# Patient Record
Sex: FEMALE | Race: WHITE | NOT HISPANIC OR LATINO | Employment: OTHER | URBAN - METROPOLITAN AREA
[De-identification: names, ages, dates, MRNs, and addresses within clinical notes are randomized per-mention and may not be internally consistent; named-entity substitution may affect disease eponyms.]

---

## 2022-04-10 ENCOUNTER — HISTORICAL (OUTPATIENT)
Dept: ADMINISTRATIVE | Facility: HOSPITAL | Age: 87
End: 2022-04-10

## 2022-04-24 VITALS
WEIGHT: 147.06 LBS | OXYGEN SATURATION: 96 % | BODY MASS INDEX: 28.87 KG/M2 | SYSTOLIC BLOOD PRESSURE: 160 MMHG | DIASTOLIC BLOOD PRESSURE: 90 MMHG | HEIGHT: 60 IN

## 2022-08-16 ENCOUNTER — OFFICE VISIT (OUTPATIENT)
Dept: FAMILY MEDICINE | Facility: CLINIC | Age: 87
End: 2022-08-16
Payer: MEDICARE

## 2022-08-16 VITALS
HEIGHT: 60 IN | WEIGHT: 141.31 LBS | RESPIRATION RATE: 20 BRPM | BODY MASS INDEX: 27.74 KG/M2 | TEMPERATURE: 98 F | DIASTOLIC BLOOD PRESSURE: 77 MMHG | SYSTOLIC BLOOD PRESSURE: 119 MMHG | OXYGEN SATURATION: 96 % | HEART RATE: 89 BPM

## 2022-08-16 DIAGNOSIS — I10 HYPERTENSION, UNSPECIFIED TYPE: Primary | ICD-10-CM

## 2022-08-16 DIAGNOSIS — R55 SYNCOPE AND COLLAPSE: ICD-10-CM

## 2022-08-16 DIAGNOSIS — M80.08XA AGE-RELATED OSTEOPOROSIS WITH CURRENT PATHOLOGICAL FRACTURE, VERTEBRA(E), INITIAL ENCOUNTER FOR FRACTURE: ICD-10-CM

## 2022-08-16 DIAGNOSIS — E78.5 HYPERLIPIDEMIA, UNSPECIFIED HYPERLIPIDEMIA TYPE: ICD-10-CM

## 2022-08-16 PROCEDURE — 99214 OFFICE O/P EST MOD 30 MIN: CPT | Mod: PBBFAC | Performed by: FAMILY MEDICINE

## 2022-08-16 RX ORDER — SIMVASTATIN 10 MG/1
10 TABLET, FILM COATED ORAL NIGHTLY
COMMUNITY
End: 2022-12-01 | Stop reason: SDUPTHER

## 2022-08-16 RX ORDER — HYDROXYZINE HYDROCHLORIDE 25 MG/1
25 TABLET, FILM COATED ORAL NIGHTLY
Qty: 90 TABLET | Refills: 0 | Status: SHIPPED | OUTPATIENT
Start: 2022-08-16 | End: 2022-08-16

## 2022-08-16 RX ORDER — BENAZEPRIL HYDROCHLORIDE 10 MG/1
10 TABLET ORAL DAILY
COMMUNITY
End: 2022-11-29 | Stop reason: SDUPTHER

## 2022-08-16 RX ORDER — HYDROXYZINE HYDROCHLORIDE 25 MG/1
25 TABLET, FILM COATED ORAL NIGHTLY PRN
Qty: 90 TABLET | Refills: 0 | Status: SHIPPED | OUTPATIENT
Start: 2022-08-16 | End: 2022-11-14

## 2022-08-16 NOTE — PROGRESS NOTES
[facility]    Subjective:        Maria E Hernandez is a 89 y.o. female who  has no past medical history on file.  She presents to clinic today for follow-up of chronic medical conditions. Patient has been having vertigo:    Vertigo.  started,  and  3 episodes and fell.  fell in the house.  and today had 2 episodes.  3 weeks ago felt that the room is upside down and threw up. Next day she was fine. She has a hard time going to sleep and keep sleeping  Feels like the room is spinning. No warning, feels like if she turned her head too quickly. Lasted couple hours last time. Usually she falls and its instant couple of mins. No confusion post ictally. No loss of hearing, blurry vision, No vision loss. No hx of seizures or migraines. Dizzy when she looks up and looks to the side.     No sleep apnea.   Insomnia:  Goes to sleep 9, 9:30 takes couple of hours for her to fall asleep. Had opposite effect when she had the trazodone. Have to use the bathroom a lot.   Patient says her incontinence is more her chart, had a  has, and well patient says that she wakes up 4-5 times due to urge to urinate some she does not make it to the bathroom.      Patient is independent able to pay to complete her daily activity of living cooking, cleaning, bathing, dressing herself.  Patient is able to drive and has a license.    Patient has had no falls as of late.  Patient says that the vertigo instances back proximally 10+ years ago that she fell and hit her head, however no other medical conditions except for hypertension and hyperlipidemia which she takes simvastatin and benazepril.  Patient has never had imaging or blood test on her records.    Appetitie is good  Mood is good  Can walk     Review of Systems:  10 point ROS negative except for HPI    Objective:   Vital Signs:  Vitals:    22 1251   BP: 119/77   Pulse: 89   Resp: 20   Temp: 98.2 °F (36.8 °C)        Body mass index is 27.74 kg/m².      General:  Well developed, well nourished, no acute respiratory distress  Head: Normocephalic, atraumatic  Eyes: PERRL, anicteric sclera  Throat: No posterior pharyngeal erythema or exudate, no tonsillar exudate  Neck: supple, normal ROM, no JVD  CVS:  RRR, S1 and S2 normal, no murmurs, no added heart sounds, rubs, gallops, regular peripheral pulses, and no peripheral edema  Resp:  Lungs clear to auscultation bilaterally, no wheezes, rales, or rhonci  GI:  Abdomen soft, non-tender, non-distended, normoactive bowel sounds  MSK:  Full range of motion, no obvious deformities   Skin:  No rashes, ulcers, erythema  Neuro:  Alert and oriented x3,   Cerebellar testing negative, FNF negative, cranial nerves 2-12 with no gross defect, gaze word nystagmus that is extinguishable after 4-5 beats.  HINTS exam is positive for peripheral vertigo.  Asya-Hallpike was positive for dizziness.  With no overt nystagmus.  Psych:  Appropriate mood and affect         Laboratory:  No results found for: WBC, HGB, HCT, PLT, MCV, RDW, IRON, TIBC, FERRITIN, AZPHRLOO17, FOLATE  No results found for: HGBA1C, EAG, GLUF, MICROALBUR, LDLCALC, CREATININE, CREATRANDUR, PROTEINURINE No results found for: NA, K, CL, CO2, BUN, CREATININE, GLU, CALCIUM, MG, PHOS  No results found for: TSH, [id number], L4ZKTXG, M1PTJAV, THYROIDAB         Current Medications:  No current outpatient medications     Assessment and Plan:        Health Maintenance Due   Topic Date Due    Lipid Panel  Never done    TETANUS VACCINE  Never done    Shingles Vaccine (1 of 2) Never done    Pneumococcal Vaccines (Age 65+) (2 - PPSV23 or PCV20) 09/23/2017    COVID-19 Vaccine (3 - Booster for Pfizer series) 07/18/2021        BPPV:  -HINTS positive, will get CT scan to rule out any structural defect in the brain   -Give handouts for epley maneuver  -Meclizine if patient has anymore symptoms, and does not improve with epley    Htn:   119/77  Benazpril 10    HLD:  Simvistatin  10    Insomnia:  Hydroxyzine HCL 25 prn QHS     HCM:  Will get a dexa    RTC in 2 months   Beronica Carrillo MD      Answers for HPI/ROS submitted by the patient on 8/15/2022  activity change: No  unexpected weight change: No  neck pain: No  hearing loss: Yes  rhinorrhea: No  trouble swallowing: No  eye discharge: No  visual disturbance: No  chest tightness: No  wheezing: No  chest pain: No  palpitations: No  blood in stool: No  constipation: No  vomiting: No  diarrhea: No  polydipsia: No  polyuria: No  difficulty urinating: No  hematuria: No  menstrual problem: No  dysuria: No  joint swelling: No  arthralgias: No  headaches: No  weakness: No  confusion: No  dysphoric mood: No

## 2022-09-06 ENCOUNTER — HOSPITAL ENCOUNTER (OUTPATIENT)
Dept: RADIOLOGY | Facility: HOSPITAL | Age: 87
Discharge: HOME | End: 2022-09-06
Attending: STUDENT IN AN ORGANIZED HEALTH CARE EDUCATION/TRAINING PROGRAM
Payer: MEDICARE

## 2022-09-06 DIAGNOSIS — R55 SYNCOPE AND COLLAPSE: ICD-10-CM

## 2022-09-06 PROCEDURE — 70450 CT HEAD/BRAIN W/O DYE: CPT | Mod: TC

## 2022-10-24 ENCOUNTER — OFFICE VISIT (OUTPATIENT)
Dept: FAMILY MEDICINE | Facility: CLINIC | Age: 87
End: 2022-10-24
Payer: MEDICARE

## 2022-10-24 VITALS
DIASTOLIC BLOOD PRESSURE: 79 MMHG | HEART RATE: 72 BPM | HEIGHT: 60 IN | BODY MASS INDEX: 27.35 KG/M2 | WEIGHT: 139.31 LBS | SYSTOLIC BLOOD PRESSURE: 123 MMHG | OXYGEN SATURATION: 96 % | TEMPERATURE: 98 F | RESPIRATION RATE: 20 BRPM

## 2022-10-24 DIAGNOSIS — F51.01 PRIMARY INSOMNIA: ICD-10-CM

## 2022-10-24 DIAGNOSIS — L60.0 INGROWN NAIL: ICD-10-CM

## 2022-10-24 DIAGNOSIS — E78.5 HYPERLIPIDEMIA, UNSPECIFIED HYPERLIPIDEMIA TYPE: Primary | ICD-10-CM

## 2022-10-24 DIAGNOSIS — Z79.899 ENCOUNTER FOR LONG-TERM (CURRENT) USE OF MEDICATIONS: ICD-10-CM

## 2022-10-24 DIAGNOSIS — I10 HYPERTENSION, UNSPECIFIED TYPE: ICD-10-CM

## 2022-10-24 DIAGNOSIS — Z00.00 HEALTHCARE MAINTENANCE: ICD-10-CM

## 2022-10-24 PROBLEM — G47.00 INSOMNIA, UNSPECIFIED: Status: ACTIVE | Noted: 2022-10-24

## 2022-10-24 LAB
ALBUMIN SERPL-MCNC: 4.3 GM/DL (ref 3.4–4.8)
ALBUMIN/GLOB SERPL: 1.5 RATIO (ref 1.1–2)
ALP SERPL-CCNC: 72 UNIT/L (ref 40–150)
ALT SERPL-CCNC: 16 UNIT/L (ref 0–55)
AST SERPL-CCNC: 24 UNIT/L (ref 5–34)
BASOPHILS # BLD AUTO: 0.09 X10(3)/MCL (ref 0–0.2)
BASOPHILS NFR BLD AUTO: 1.2 %
BILIRUBIN DIRECT+TOT PNL SERPL-MCNC: 0.8 MG/DL
BUN SERPL-MCNC: 11.8 MG/DL (ref 9.8–20.1)
CALCIUM SERPL-MCNC: 10.3 MG/DL (ref 8.4–10.2)
CHLORIDE SERPL-SCNC: 100 MMOL/L (ref 98–111)
CHOLEST SERPL-MCNC: 200 MG/DL
CHOLEST/HDLC SERPL: 2 {RATIO} (ref 0–5)
CO2 SERPL-SCNC: 29 MMOL/L (ref 23–31)
CREAT SERPL-MCNC: 0.9 MG/DL (ref 0.55–1.02)
DEPRECATED CALCIDIOL+CALCIFEROL SERPL-MC: 37.5 NG/ML (ref 30–80)
EOSINOPHIL # BLD AUTO: 0.31 X10(3)/MCL (ref 0–0.9)
EOSINOPHIL NFR BLD AUTO: 4.2 %
ERYTHROCYTE [DISTWIDTH] IN BLOOD BY AUTOMATED COUNT: 12.9 % (ref 11.5–17)
GFR SERPLBLD CREATININE-BSD FMLA CKD-EPI: 60 MLS/MIN/1.73/M2
GLOBULIN SER-MCNC: 2.9 GM/DL (ref 2.4–3.5)
GLUCOSE SERPL-MCNC: 115 MG/DL (ref 75–121)
HCT VFR BLD AUTO: 47.2 % (ref 37–47)
HDLC SERPL-MCNC: 87 MG/DL (ref 35–60)
HGB BLD-MCNC: 15.4 GM/DL (ref 12–16)
IMM GRANULOCYTES # BLD AUTO: 0.03 X10(3)/MCL (ref 0–0.04)
IMM GRANULOCYTES NFR BLD AUTO: 0.4 %
LDLC SERPL CALC-MCNC: 91 MG/DL (ref 50–140)
LYMPHOCYTES # BLD AUTO: 1.6 X10(3)/MCL (ref 0.6–4.6)
LYMPHOCYTES NFR BLD AUTO: 21.8 %
MCH RBC QN AUTO: 32.1 PG (ref 27–31)
MCHC RBC AUTO-ENTMCNC: 32.6 MG/DL (ref 33–36)
MCV RBC AUTO: 98.3 FL (ref 80–94)
MONOCYTES # BLD AUTO: 0.62 X10(3)/MCL (ref 0.1–1.3)
MONOCYTES NFR BLD AUTO: 8.4 %
NEUTROPHILS # BLD AUTO: 4.7 X10(3)/MCL (ref 2.1–9.2)
NEUTROPHILS NFR BLD AUTO: 64 %
NRBC BLD AUTO-RTO: 0 %
PLATELET # BLD AUTO: 334 X10(3)/MCL (ref 130–400)
PMV BLD AUTO: 9.5 FL (ref 7.4–10.4)
POTASSIUM SERPL-SCNC: 4.5 MMOL/L (ref 3.5–5.1)
PROT SERPL-MCNC: 7.2 GM/DL (ref 5.8–7.6)
RBC # BLD AUTO: 4.8 X10(6)/MCL (ref 4.2–5.4)
SODIUM SERPL-SCNC: 139 MMOL/L (ref 132–146)
T4 FREE SERPL-MCNC: 0.94 NG/DL (ref 0.7–1.48)
TRIGL SERPL-MCNC: 111 MG/DL (ref 37–140)
TSH SERPL-ACNC: 1.56 UIU/ML (ref 0.35–4.94)
VLDLC SERPL CALC-MCNC: 22 MG/DL
WBC # SPEC AUTO: 7.4 X10(3)/MCL (ref 4.5–11.5)

## 2022-10-24 PROCEDURE — 85025 COMPLETE CBC W/AUTO DIFF WBC: CPT | Performed by: FAMILY MEDICINE

## 2022-10-24 PROCEDURE — 90677 PCV20 VACCINE IM: CPT | Mod: PBBFAC

## 2022-10-24 PROCEDURE — 82306 VITAMIN D 25 HYDROXY: CPT | Performed by: FAMILY MEDICINE

## 2022-10-24 PROCEDURE — 36415 COLL VENOUS BLD VENIPUNCTURE: CPT | Performed by: FAMILY MEDICINE

## 2022-10-24 PROCEDURE — G0008 ADMIN INFLUENZA VIRUS VAC: HCPCS | Mod: PBBFAC

## 2022-10-24 PROCEDURE — 99214 OFFICE O/P EST MOD 30 MIN: CPT | Mod: PBBFAC,25 | Performed by: FAMILY MEDICINE

## 2022-10-24 PROCEDURE — 84443 ASSAY THYROID STIM HORMONE: CPT | Performed by: FAMILY MEDICINE

## 2022-10-24 PROCEDURE — 80053 COMPREHEN METABOLIC PANEL: CPT | Performed by: FAMILY MEDICINE

## 2022-10-24 PROCEDURE — 80061 LIPID PANEL: CPT | Performed by: FAMILY MEDICINE

## 2022-10-24 PROCEDURE — 84439 ASSAY OF FREE THYROXINE: CPT | Performed by: FAMILY MEDICINE

## 2022-10-24 NOTE — PROGRESS NOTES
"DOS: 10/24/22  Pt was discussed and seen with resident at the time of visit. Agree with above.     Jay Jay Martinez MD, CMD      [facility]    ### Person that attends appointment with patient is patient's partner not her daughter ####    Chief Complaint:      Hypertension (FU HTN)     Subjective:     HPI:  Maria E Hernandez is a 89 y.o. female with PMH of hypertension, hyperlipidemia and insomnia.   She presents to Geriatric clinic today with her partner for Hypertension (FU HTN).        Today, she has no acute complaints with the exception of bilateral great toe nail pain, intermittent, chronic. States usually bothers her when nail gets long. She cuts nails on her own. No pain at present.    Patient states she no longer has vertigo symptoms at this time and that Epley maneuvers provided at last visit have improved her symptoms.  Patient's insomnia is improved with the hydroxyzine provided at last visit as well.  Patient states she still performs all her ADLs which includes driving, cooking, mowing the grass, finances and bathing.  She denies recent falls, syncopal episodes, dizziness, lightheadedness chest pain, N/V/D, and abdominal pain.    Jami assessment:  No falls  Independent of ADLs  Good appetite  Sleeping better with hydroxyzine  Still drives without issues      Review of Systems  A comprehensive 12 point review of systems was completed.  Please see above for pertinent positives and negatives.     Objective:   Last 24 Hour Vital Signs:  Vitals  BP: 123/79  Temp: 97.7 °F (36.5 °C)  Temp src: Oral  Pulse: 72  Resp: 20  SpO2: 96 %  Height: 4' 11.84" (152 cm)  Weight: 63.2 kg (139 lb 5.3 oz)    Physical Examination:  General: Awake, alert, & oriented to person, place & time. No acute distress  Psychiatric: Mood and affect normal  HEENT: Normocephalic, atraumatic. Face symmetric.  Cardiovascular: Regular rate & rhythm. Normal S1 & S2 w/out murmurs, rubs or gallops.  Pulmonary: Bilateral " symmetric chest rise. Non-labored, no wheezes, rhonchi or crackles are appreciated.  Abdominal:  Nondistended. Bowel sounds present  Extremities: No clubbing, cyanosis or edema.  Skin:  Exposed skin is warm & dry; dystrophic great toenail  Neuro:   Patient moves all extremities equally. Sensation intact bilateraly.    Assessment & Plan:     Bilateral thickened/ingrown toenail  -referral to podiatry has been placed    BPPV  -CT head without contrast shows no acute abnormalities however chronic microvascular changes   -Continues to perform Epley maneuver exercises with good success    Htn   -123/79 today  -Continue Benazpril 10    HLD  -Continue Simvistatin 10  -Lipid panel pending    Insomnia  -Continue Hydroxyzine HCL 25 prn QHS     Health Maintenance  Colon cancer screening: N/A  Lung Cancer screen: N/A, lifetime nonsmoker  Mammogram: N/A  PapSmear: N/A  Hep C: N/A  Dexa: pending  Vaccines      Pneumonia: Prevnar 20 today      Annual Flu:  Received today at this visit (10/24/2022)       Zoster:  patient states Medicare was not covering Shingrix, declines for now      Tdap:  Up-to-date      To be addressed at next follow-up  -CMP, CBC, vitamin-D, TSH, free T4, lipid panel  -podiatry appointment  -DEXA scan      Follow-ups  -Follow-up in 3 months      Olayinka Gomez MD  Internal Medicine - PGY-2

## 2022-11-03 ENCOUNTER — HOSPITAL ENCOUNTER (OUTPATIENT)
Dept: RADIOLOGY | Facility: HOSPITAL | Age: 87
Discharge: HOME | End: 2022-11-03
Attending: STUDENT IN AN ORGANIZED HEALTH CARE EDUCATION/TRAINING PROGRAM
Payer: MEDICARE

## 2022-11-03 DIAGNOSIS — E78.5 HYPERLIPIDEMIA, UNSPECIFIED HYPERLIPIDEMIA TYPE: ICD-10-CM

## 2022-11-03 DIAGNOSIS — I10 HYPERTENSION, UNSPECIFIED TYPE: ICD-10-CM

## 2022-11-03 DIAGNOSIS — R55 SYNCOPE AND COLLAPSE: ICD-10-CM

## 2022-11-03 DIAGNOSIS — M80.08XA AGE-RELATED OSTEOPOROSIS WITH CURRENT PATHOLOGICAL FRACTURE, VERTEBRA(E), INITIAL ENCOUNTER FOR FRACTURE: ICD-10-CM

## 2022-11-03 PROCEDURE — 77080 DXA BONE DENSITY AXIAL: CPT | Mod: TC

## 2022-11-29 RX ORDER — BENAZEPRIL HYDROCHLORIDE 10 MG/1
10 TABLET ORAL DAILY
Qty: 90 TABLET | Refills: 1 | Status: SHIPPED | OUTPATIENT
Start: 2022-11-29 | End: 2022-12-01 | Stop reason: SDUPTHER

## 2022-12-01 RX ORDER — BENAZEPRIL HYDROCHLORIDE 10 MG/1
10 TABLET ORAL DAILY
Qty: 90 TABLET | Refills: 1 | Status: SHIPPED | OUTPATIENT
Start: 2022-12-01 | End: 2023-01-19

## 2022-12-01 NOTE — TELEPHONE ENCOUNTER
Dr Coello, the patient asks if you can send a 10 day supply of the Lotensin it has not been delivered yet and she called they said it is on the truck.  She is out.

## 2022-12-02 RX ORDER — SIMVASTATIN 10 MG/1
10 TABLET, FILM COATED ORAL NIGHTLY
Qty: 90 TABLET | Refills: 1 | Status: SHIPPED | OUTPATIENT
Start: 2022-12-02 | End: 2023-01-19 | Stop reason: SDUPTHER

## 2022-12-05 RX ORDER — SIMVASTATIN 10 MG/1
10 TABLET, FILM COATED ORAL
COMMUNITY
Start: 2022-01-11 | End: 2023-01-19

## 2023-01-09 NOTE — TELEPHONE ENCOUNTER
Dr Coello, I called to check on the status of the referral to Dr Rosario and was told that he does not take the patients insurance.  I spoke to the patients significant other and she informed me that she went to a  and they were able to take care of the problem so the referral is no longer needed.

## 2023-01-19 ENCOUNTER — OFFICE VISIT (OUTPATIENT)
Dept: FAMILY MEDICINE | Facility: CLINIC | Age: 88
End: 2023-01-19
Payer: MEDICARE

## 2023-01-19 VITALS
HEART RATE: 68 BPM | HEIGHT: 60 IN | RESPIRATION RATE: 20 BRPM | TEMPERATURE: 98 F | SYSTOLIC BLOOD PRESSURE: 145 MMHG | BODY MASS INDEX: 27.44 KG/M2 | DIASTOLIC BLOOD PRESSURE: 76 MMHG | WEIGHT: 139.75 LBS | OXYGEN SATURATION: 97 %

## 2023-01-19 DIAGNOSIS — E78.5 HYPERLIPIDEMIA, UNSPECIFIED HYPERLIPIDEMIA TYPE: Primary | ICD-10-CM

## 2023-01-19 DIAGNOSIS — F51.01 PRIMARY INSOMNIA: ICD-10-CM

## 2023-01-19 DIAGNOSIS — I10 HYPERTENSION, UNSPECIFIED TYPE: ICD-10-CM

## 2023-01-19 PROCEDURE — 99214 OFFICE O/P EST MOD 30 MIN: CPT | Mod: PBBFAC | Performed by: FAMILY MEDICINE

## 2023-01-19 RX ORDER — BENAZEPRIL HYDROCHLORIDE 10 MG/1
10 TABLET ORAL DAILY
COMMUNITY
Start: 2022-01-11 | End: 2023-01-19

## 2023-01-19 RX ORDER — HYDROXYZINE HYDROCHLORIDE 25 MG/1
25 TABLET, FILM COATED ORAL NIGHTLY
Qty: 30 TABLET | Refills: 2 | OUTPATIENT
Start: 2023-01-19 | End: 2023-02-18

## 2023-01-19 RX ORDER — SIMVASTATIN 10 MG/1
10 TABLET, FILM COATED ORAL NIGHTLY
Qty: 90 TABLET | Refills: 1 | Status: SHIPPED | OUTPATIENT
Start: 2023-01-19 | End: 2023-05-18 | Stop reason: SDUPTHER

## 2023-01-19 RX ORDER — BENAZEPRIL HYDROCHLORIDE 10 MG/1
10 TABLET ORAL DAILY
Qty: 90 TABLET | Refills: 1 | Status: CANCELLED | OUTPATIENT
Start: 2023-01-19 | End: 2023-04-19

## 2023-01-19 NOTE — PATIENT INSTRUCTIONS
"-Look on YouTube for "Elderly balance exercises" or "Geriatrics Balance Exercises"  -Buy over the counter: oral dissolving tablets of melatonin, 10 mg tablets, take 1 hour before sleep  -Stop taking the benazepril. Check BP at home. If you notice the top number is above 150 on two separate checks, then please call the clinic and let us know  "

## 2023-01-19 NOTE — PROGRESS NOTES
[facility] Visit    ### Person that attends appointment with patient is patient's partner not her daughter ####    Chief Complaint:      Hypertension (FU HLD/HTN)     Subjective:     HPI:  Maria E Hernandez is a 89 y.o. female with PMH of hypertension, hyperlipidemia and insomnia.   She presents to Geriatric clinic today with her partner for follow up visit for Hypertension (FU HLD/HTN).        Today, she overall feels well. About 1.5 weeks ago, she had an urgent care visit for 1.5 days of diarrhe associated with fatigue/weakness/chills. This has completely resolved    About 1 month ago, she had 1 fall. She was carrying cushions and was going to step down into her garage (1 step) but fell backward prior to stepping down. She hit her elbow. On a daily basis she feels comfortable walking independently but she does use her walker if she is walking at night. She does, however, feel unsteady when she is stepping up or down into/out of her house (there is 1 step from her garage and 1 step at the main entrance). No vision problems and no daily knee pain (has some knee pain when the weather changes). She has not been having any dizziness.     No memory problems. She manages her own medications independently. Was taking hydroxyzine, prescribed last visit, for insomnia but she ran out 1-2 weeks ago    Home -120    Ambulating - partially dependent - uses walker sometimes  Feeding - independent  Dressing - independent  Personal Hygiene - independent  Continence - independent  Toileting - independent    iADL  -Transportation and shopping - partly dependent - her partner shops for her although she is able to if she needs to  Managing finances - independent - manages her own finances. Does not forget to pay bills  Meal Preparation - partly dependent - her partner has always been the one who cooks; this is not a new change  House Cleaning - independent  Managing communication with others - independent  Managing  "Medication - independent  Driving - She is able to drive on her own in familiar places. Her partner has been driving her around lately b/c her partner is nervous that her reaction time would not be fast enough to drive.    Review of Systems  A comprehensive 12 point review of systems was completed.  Please see above for pertinent positives and negatives.     Objective:   Last 24 Hour Vital Signs:  Vitals  BP: (!) 145/76  Temp: 97.5 °F (36.4 °C)  Temp src: Oral  Pulse: 68  Resp: 20  SpO2: 97 %  Height: 4' 11.84" (152 cm)  Weight: 63.4 kg (139 lb 12.4 oz)    Physical Examination:  General - Appears comfortable, appropriatley conversive   Mental Status - alert and oriented x 3, speaking in logical, relevant sentences   HEENT - no rhinorrhea   Cardiac - RRR, no murmurs, rubs, or gallops; no edema in LE   Respiratory - breathing comfortably; clear to ascultation bilaterally   Abdominal - nondistended, soft, nontender to palpation   Extremities - LE, UE, and joints are nonerythematous and nonswollen   Skin - no rashes or bruises seen on skin      Assessment & Plan:     Fall  -Encourage home excercises to improve lower extremity strength  -Encourage her to bring her walker with her outside of the home    Bilateral thickened/ingrown toenail  -referral to podiatry had been placed last visit - will f/u next visit    Osteopenia  -continue Vit D supplement 1000 U daily OTC    BPPV-resolved  -CT head without contrast shows no acute abnormalities however chronic microvascular changes   -Resolved s/p Epley maneuvers    Htn   -Home -120 - will d/c benzapril  -check Home BP logs; if SBP > 150 then call clinic    HLD  -Continue Simvistatin 10    Insomnia  -d/c hydroxyzine  -start melatonin    Health Maintenance  Colon cancer screening: N/A  Lung Cancer screen: N/A, lifetime nonsmoker  Mammogram: N/A  PapSmear: N/A  Hep C: N/A  Dexa: pending  Vaccines      Pneumonia: UTD      Annual Flu:  UTD      Zoster:  patient states " Medicare was not covering Shingrix, declines for now, also may not be needed due to advanced age      Tdap:  Up-to-date    Anton Mendez PGY 3

## 2023-02-16 ENCOUNTER — OFFICE VISIT (OUTPATIENT)
Dept: FAMILY MEDICINE | Facility: CLINIC | Age: 88
End: 2023-02-16
Payer: MEDICARE

## 2023-02-16 VITALS
OXYGEN SATURATION: 97 % | BODY MASS INDEX: 27.57 KG/M2 | HEART RATE: 77 BPM | SYSTOLIC BLOOD PRESSURE: 134 MMHG | DIASTOLIC BLOOD PRESSURE: 81 MMHG | RESPIRATION RATE: 20 BRPM | HEIGHT: 60 IN | WEIGHT: 140.44 LBS | TEMPERATURE: 98 F

## 2023-02-16 DIAGNOSIS — I10 HYPERTENSION, UNSPECIFIED TYPE: Primary | ICD-10-CM

## 2023-02-16 DIAGNOSIS — F51.01 PRIMARY INSOMNIA: ICD-10-CM

## 2023-02-16 PROCEDURE — 99214 OFFICE O/P EST MOD 30 MIN: CPT | Mod: PBBFAC | Performed by: FAMILY MEDICINE

## 2023-02-16 NOTE — PATIENT INSTRUCTIONS
You do not have to check your blood pressure regularly anymore, however, please check it if you have symptoms such a headache, dizziness, nausea, or otherwise feel unwell. If the top pressure is above 150, please call the office.

## 2023-02-16 NOTE — PROGRESS NOTES
"[facility] Office Visit Note    Subjective:       Patient ID: Maria E Hernandez is a 89 y.o. female.    Chief Complaint: Hypertension (FU)      HPI:  89 y.o. female presents to [facility] clinic with partner for f/u HTN and insomnia.    HTN  - benazepril stopped at last visit, keeping log with BP mostly  120s-140s/60s-70s  - denies any HA, dizziness, n/a, palpitations, fatigue, SOB, chest pain    Insomnia  - switched from hydroxyzine to melatonin, reports sleeping better    Geriatric assessment  - reports no falls, appetite doing good, no issues with mood or sleep (see above for sleep)    iADL  -Transportation and shopping - partly dependent - her partner shops for her although she is able to if she needs to  Managing finances - independent - manages her own finances. Does not forget to pay bills  Meal Preparation - partly dependent - her partner has always been the one who cooks; this is not a new change; able to feed self independently without issue  House Cleaning - independent  Managing communication with others - independent  Managing Medication - independent  Driving - She is able to drive on her own in familiar places. Denies issues with getting lost    Review of Systems:  Gen: denies fever and chills  Resp: denies cough, shortness of breath and wheezing  CV: denies chest pain and palpitations  GI: denies nausea, vomiting, abdominal pain  Neuro: denies numbness, tingling, headache and dizziness    Objective:      /81 (BP Location: Left arm, Patient Position: Sitting, BP Method: Medium (Automatic))   Pulse 77   Temp 97.5 °F (36.4 °C) (Oral)   Resp 20   Ht 4' 11.84" (1.52 m)   Wt 63.7 kg (140 lb 6.9 oz)   SpO2 97%   BMI 27.57 kg/m²     Physical Exam:  Gen: alert and oriented, NAD  CV: RRR, S1 and S2 present, no murmurs appreciated on exam  Resp: CTA bilaterally, breathing nonlabored on room air   Abd: Soft, non-distended, non-tender  Skin: No rashes or abnormal skin lesions, no apparent " jaundice or bruising  MSK: ambulating spontaneously with appropriate ROM in all extremities      Current Outpatient Medications:     simvastatin (ZOCOR) 10 MG tablet, Take 1 tablet (10 mg total) by mouth every evening., Disp: 90 tablet, Rfl: 1   Current Outpatient Medications   Medication Instructions    simvastatin (ZOCOR) 10 mg, Oral, Nightly        Assessment/Plan:     1. Hypertension, unspecified type        2. Primary insomnia          HTN appears controlled at this time. No need for continued logs. Precautions given in pt instructions. Advised pt and partner to measure BP if pt feels ill or develops sxs such as HA, dizziness, nausea, vomiting, chest pain.     Insomnia improved on melatonin; cont taking melatonin.    RTC 3 for routine f/u or sooner if needed

## 2023-05-18 ENCOUNTER — OFFICE VISIT (OUTPATIENT)
Dept: FAMILY MEDICINE | Facility: CLINIC | Age: 88
End: 2023-05-18
Payer: MEDICARE

## 2023-05-18 VITALS
SYSTOLIC BLOOD PRESSURE: 143 MMHG | BODY MASS INDEX: 27.71 KG/M2 | HEART RATE: 72 BPM | RESPIRATION RATE: 20 BRPM | TEMPERATURE: 98 F | WEIGHT: 141.13 LBS | OXYGEN SATURATION: 97 % | DIASTOLIC BLOOD PRESSURE: 85 MMHG | HEIGHT: 60 IN

## 2023-05-18 DIAGNOSIS — I10 HYPERTENSION, UNSPECIFIED TYPE: Primary | ICD-10-CM

## 2023-05-18 PROCEDURE — 99213 OFFICE O/P EST LOW 20 MIN: CPT | Mod: PBBFAC | Performed by: FAMILY MEDICINE

## 2023-05-18 RX ORDER — SIMVASTATIN 10 MG/1
10 TABLET, FILM COATED ORAL NIGHTLY
Qty: 90 TABLET | Refills: 1 | Status: SHIPPED | OUTPATIENT
Start: 2023-05-18 | End: 2023-09-14 | Stop reason: SDUPTHER

## 2023-05-18 NOTE — PROGRESS NOTES
[facility] Office Visit Note      ### Person that attends appointment with patient is her partner (Mandy) not her daughter ####    Subjective:       Patient ID: Maria E Hernandez is a 89 y.o. female.    Chief Complaint: Follow-up and Hypertension      HPI:  89 y.o. female presents to [facility] clinic with partner for f/u. Reports sleep has been better with melatonin 10mg SL, but she still occasionally wakes up in the middle of the nights, so she usually ends up taking an additional tablet. Feeling a bit tired today due to trouble sleeping last night, but otherwise doing well. Partner reports compliance with low-salt diet as she is the one that usually cools. Recent Fall as noted below; asymptomatic.     HTN  - benazepril previously D/C due to home Bps bet 120-140s. No longer checking BP at home.   - denies any HA, dizziness, n/a, palpitations, fatigue, SOB, chest pain    Insomnia  - previously switched from hydroxyzine to melatonin, reports sleeping better    Geriatric assessment  - one fall 1 week ago, slipped out of bed reaching for her walker, no head trauma, only mild bruising of left thigh appetite doing good, no issues with mood , sleep (see above)    iADL  -Transportation and shopping - partly dependent - partner helps  Managing finances - independent - manages her own finances. Does not forget to pay bills  Meal Preparation - partly dependent - her partner has always been the one who cooks; this is not a new change; able to feed self independently without issue  House Cleaning - independent  Managing communication with others - independent  Managing Medication - independent  Driving - She is able to drive on her own in familiar places such as the library. Denies issues with getting lost    Review of Systems:  Gen: denies fever and chills  Resp: denies cough, shortness of breath and wheezing  CV: denies chest pain and palpitations  GI: denies nausea, vomiting, abdominal pain  Neuro: denies numbness,  "tingling, headache and dizziness    Objective:      BP (!) 143/85 (BP Location: Left arm, Patient Position: Sitting, BP Method: Large (Automatic))   Pulse 72   Temp 98.4 °F (36.9 °C) (Oral)   Resp 20   Ht 4' 11.84" (1.52 m)   Wt 64 kg (141 lb 1.5 oz)   SpO2 97%   BMI 27.70 kg/m²     Physical Exam:  Gen: alert and oriented, NAD  HEENT: AT/NC, EOMI, Neck supple  CV: RRR, S1 and S2 present, no murmurs appreciated on exam  Resp: CTA bilaterally, breathing nonlabored on room air   Abd: Soft, non-distended, non-tender  Skin: + yellow, healing ecchymosis of right thigh. NTTP. No hematoma noted. No rashes or abnormal skin lesions, no apparent jaundice or bruising  MSK: ambulating spontaneously with appropriate ROM in all extremities, uses walker intermittently      Current Outpatient Medications:     simvastatin (ZOCOR) 10 MG tablet, Take 1 tablet (10 mg total) by mouth every evening., Disp: 90 tablet, Rfl: 1   Current Outpatient Medications   Medication Instructions    simvastatin (ZOCOR) 10 mg, Oral, Nightly        Assessment/Plan:     HTN  -/85, asymptomatic  -Advised to start keeping BP log again, continued compliance with low-salt diet  -Previously on Benazepril 10mg, will restart if BP in persistently     HLD  -last LDL 90, continue Simvastatin 10mg    Insomnia  -continue melatonin 10mg PRN qhs    Osteopenia  -11/2022 DEXA, moderate fracture risk   -Fall Precautions given      RTC 4 months. Labs at that visit.     Susan De Paz MD  [facility]  Internal Medicine Resident PGY-3      "

## 2023-09-14 ENCOUNTER — OFFICE VISIT (OUTPATIENT)
Dept: FAMILY MEDICINE | Facility: CLINIC | Age: 88
End: 2023-09-14
Payer: MEDICARE

## 2023-09-14 VITALS
SYSTOLIC BLOOD PRESSURE: 139 MMHG | HEART RATE: 82 BPM | RESPIRATION RATE: 20 BRPM | TEMPERATURE: 98 F | HEIGHT: 60 IN | BODY MASS INDEX: 27.74 KG/M2 | OXYGEN SATURATION: 96 % | WEIGHT: 141.31 LBS | DIASTOLIC BLOOD PRESSURE: 76 MMHG

## 2023-09-14 DIAGNOSIS — E78.5 HYPERLIPIDEMIA, UNSPECIFIED HYPERLIPIDEMIA TYPE: ICD-10-CM

## 2023-09-14 DIAGNOSIS — F51.01 PRIMARY INSOMNIA: ICD-10-CM

## 2023-09-14 DIAGNOSIS — I10 PRIMARY HYPERTENSION: ICD-10-CM

## 2023-09-14 DIAGNOSIS — Z00.00 HEALTHCARE MAINTENANCE: Primary | ICD-10-CM

## 2023-09-14 DIAGNOSIS — M80.08XA AGE-RELATED OSTEOPOROSIS WITH CURRENT PATHOLOGICAL FRACTURE, VERTEBRA(E), INITIAL ENCOUNTER FOR FRACTURE: ICD-10-CM

## 2023-09-14 LAB
ALBUMIN SERPL-MCNC: 4.5 G/DL (ref 3.4–4.8)
ALBUMIN/GLOB SERPL: 1.4 RATIO (ref 1.1–2)
ALP SERPL-CCNC: 76 UNIT/L (ref 40–150)
ALT SERPL-CCNC: 16 UNIT/L (ref 0–55)
AST SERPL-CCNC: 25 UNIT/L (ref 5–34)
BASOPHILS # BLD AUTO: 0.08 X10(3)/MCL
BASOPHILS NFR BLD AUTO: 0.9 %
BILIRUB SERPL-MCNC: 0.9 MG/DL
BUN SERPL-MCNC: 14 MG/DL (ref 9.8–20.1)
CALCIUM SERPL-MCNC: 10.2 MG/DL (ref 8.4–10.2)
CHLORIDE SERPL-SCNC: 104 MMOL/L (ref 98–111)
CHOLEST SERPL-MCNC: 202 MG/DL
CHOLEST/HDLC SERPL: 2 {RATIO} (ref 0–5)
CO2 SERPL-SCNC: 27 MMOL/L (ref 23–31)
CREAT SERPL-MCNC: 1.07 MG/DL (ref 0.55–1.02)
DEPRECATED CALCIDIOL+CALCIFEROL SERPL-MC: 44.3 NG/ML (ref 30–80)
EOSINOPHIL # BLD AUTO: 0.27 X10(3)/MCL (ref 0–0.9)
EOSINOPHIL NFR BLD AUTO: 3.1 %
ERYTHROCYTE [DISTWIDTH] IN BLOOD BY AUTOMATED COUNT: 13.2 % (ref 11.5–17)
GFR SERPLBLD CREATININE-BSD FMLA CKD-EPI: 49 MLS/MIN/1.73/M2
GLOBULIN SER-MCNC: 3.2 GM/DL (ref 2.4–3.5)
GLUCOSE SERPL-MCNC: 101 MG/DL (ref 75–121)
HCT VFR BLD AUTO: 45.6 % (ref 37–47)
HDLC SERPL-MCNC: 98 MG/DL (ref 35–60)
HGB BLD-MCNC: 14.9 G/DL (ref 12–16)
IMM GRANULOCYTES # BLD AUTO: 0.03 X10(3)/MCL (ref 0–0.04)
IMM GRANULOCYTES NFR BLD AUTO: 0.3 %
LDLC SERPL CALC-MCNC: 87 MG/DL (ref 50–140)
LYMPHOCYTES # BLD AUTO: 2.08 X10(3)/MCL (ref 0.6–4.6)
LYMPHOCYTES NFR BLD AUTO: 24.2 %
MCH RBC QN AUTO: 32.1 PG (ref 27–31)
MCHC RBC AUTO-ENTMCNC: 32.7 G/DL (ref 33–36)
MCV RBC AUTO: 98.3 FL (ref 80–94)
MONOCYTES # BLD AUTO: 0.7 X10(3)/MCL (ref 0.1–1.3)
MONOCYTES NFR BLD AUTO: 8.1 %
NEUTROPHILS # BLD AUTO: 5.44 X10(3)/MCL (ref 2.1–9.2)
NEUTROPHILS NFR BLD AUTO: 63.4 %
NRBC BLD AUTO-RTO: 0 %
PLATELET # BLD AUTO: 290 X10(3)/MCL (ref 130–400)
PMV BLD AUTO: 9.7 FL (ref 7.4–10.4)
POTASSIUM SERPL-SCNC: 4 MMOL/L (ref 3.5–5.1)
PROT SERPL-MCNC: 7.7 GM/DL (ref 5.8–7.6)
RBC # BLD AUTO: 4.64 X10(6)/MCL (ref 4.2–5.4)
SODIUM SERPL-SCNC: 142 MMOL/L (ref 132–146)
TRIGL SERPL-MCNC: 83 MG/DL (ref 37–140)
TSH SERPL-ACNC: 1.66 UIU/ML (ref 0.35–4.94)
VLDLC SERPL CALC-MCNC: 17 MG/DL
WBC # SPEC AUTO: 8.6 X10(3)/MCL (ref 4.5–11.5)

## 2023-09-14 PROCEDURE — 80061 LIPID PANEL: CPT | Performed by: FAMILY MEDICINE

## 2023-09-14 PROCEDURE — 84443 ASSAY THYROID STIM HORMONE: CPT | Performed by: FAMILY MEDICINE

## 2023-09-14 PROCEDURE — 99213 OFFICE O/P EST LOW 20 MIN: CPT | Mod: PBBFAC | Performed by: FAMILY MEDICINE

## 2023-09-14 PROCEDURE — 85025 COMPLETE CBC W/AUTO DIFF WBC: CPT | Performed by: FAMILY MEDICINE

## 2023-09-14 PROCEDURE — 36415 COLL VENOUS BLD VENIPUNCTURE: CPT | Performed by: FAMILY MEDICINE

## 2023-09-14 PROCEDURE — 80053 COMPREHEN METABOLIC PANEL: CPT | Performed by: FAMILY MEDICINE

## 2023-09-14 PROCEDURE — 82306 VITAMIN D 25 HYDROXY: CPT | Performed by: FAMILY MEDICINE

## 2023-09-14 RX ORDER — SIMVASTATIN 10 MG/1
10 TABLET, FILM COATED ORAL NIGHTLY
Qty: 90 TABLET | Refills: 3 | Status: SHIPPED | OUTPATIENT
Start: 2023-09-14 | End: 2024-09-08

## 2023-09-14 NOTE — PROGRESS NOTES
[facility] Note      ### Person that attends appointment with patient is her partner (Mandy) not her daughter ####    Subjective:       Patient ID: Maria E Hernandez is a 89 y.o. female.    Chief Complaint: Hypertension and Hyperlipidemia (4 month FU)      HPI:  Ms. Hernandez is a 89 y.o. female presenting to [facility] with PMH of HTN, insomnia, HLD, osteopenia here with her partner Mandy for f/u, last seen 5/18/23. Pt complaining of episodic dizziness that occurs when she looks up, lasts a few hours to a day rarely associated with n/v (one bad episode 4 months ago). Had previously performed [place] pike and Eply, did not resolve issue. Denies chest pain, palpitations, n/v, SOB, syncope. No recent falls. Otherwise doing well.     iADL  -Transportation and shopping - partly dependent - partner helps  Managing finances - independent - manages her own finances. Does not forget to pay bills  Meal Preparation - partly dependent - her partner has always been the one who cooks; this is not a new change; able to feed self independently without issue  House Cleaning - independent  Managing communication with others - independent  Managing Medication - independent  Driving - She is able to drive on her own in familiar places such as the library. Denies issues with getting lost    Review of Systems:  Review of Systems   Constitutional:  Negative for chills and fever.   Eyes:  Negative for blurred vision.   Respiratory:  Negative for cough and shortness of breath.    Cardiovascular:  Negative for chest pain, palpitations, orthopnea and leg swelling.   Gastrointestinal:  Negative for blood in stool, constipation, diarrhea, melena, nausea and vomiting.   Genitourinary:  Negative for dysuria.   Neurological:  Positive for dizziness. Negative for seizures, loss of consciousness, weakness and headaches.   Psychiatric/Behavioral:  Negative for depression, substance abuse and suicidal ideas.          Objective:  "     /76 (BP Location: Left arm, Patient Position: Sitting, BP Method: Medium (Automatic))   Pulse 82   Temp 97.9 °F (36.6 °C) (Oral)   Resp 20   Ht 4' 11.84" (1.52 m)   Wt 64.1 kg (141 lb 5 oz)   SpO2 96%   BMI 27.74 kg/m²     Physical Exam:  Physical Exam  Constitutional:       General: She is not in acute distress.     Appearance: Normal appearance. She is normal weight. She is not ill-appearing, toxic-appearing or diaphoretic.   HENT:      Head: Normocephalic and atraumatic.      Mouth/Throat:      Mouth: Mucous membranes are moist.      Pharynx: Oropharynx is clear.   Eyes:      Extraocular Movements: Extraocular movements intact.      Pupils: Pupils are equal, round, and reactive to light.   Cardiovascular:      Rate and Rhythm: Normal rate and regular rhythm.      Pulses: Normal pulses.      Heart sounds: Normal heart sounds. No murmur heard.     No friction rub. No gallop.   Pulmonary:      Effort: Pulmonary effort is normal.      Breath sounds: Normal breath sounds.   Abdominal:      General: Abdomen is flat. Bowel sounds are normal. There is no distension.      Palpations: Abdomen is soft.      Tenderness: There is no abdominal tenderness.   Musculoskeletal:      Right lower leg: No edema.      Left lower leg: No edema.   Skin:     General: Skin is warm and dry.      Capillary Refill: Capillary refill takes less than 2 seconds.   Neurological:      General: No focal deficit present.      Mental Status: She is alert and oriented to person, place, and time. Mental status is at baseline.      Cranial Nerves: No cranial nerve deficit.      Motor: No weakness.             Current Outpatient Medications:     simvastatin (ZOCOR) 10 MG tablet, Take 1 tablet (10 mg total) by mouth every evening., Disp: 90 tablet, Rfl: 1   Current Outpatient Medications   Medication Instructions    simvastatin (ZOCOR) 10 mg, Oral, Nightly        Assessment/Plan:     HTN  -/76, asymptomatic  -Continue DASH diet and " home BP checks  -Previously on Benazepril 10mg, will restart if BP in persistently elevated    HLD  -last LDL 90, continue Simvastatin 10mg  -repeat lipid panel today    Insomnia  -continue melatonin 10mg PRN qhs    Osteopenia  -11/2022 DEXA, moderate fracture risk   -Fall Precautions given    Intermittent Dizziness  -occasional dizziness lasting , occurring   -Had [place] pike maneuver and has performed Eply at home, re-educated and will need to perform 3x daily x10 days at home  -previously stopped ACE- due to dizziness, possible orthostatic hypotension with fall  -no recent falls  -consider meclizine if no improvement    Health Maintenance  -Vaccines:    -Pneumonia: UTD    -Tdap: UTD 2018    -Zoster: insurance will not cover, try at pharmacy    -Flu: dose not in clinic, obtain at local pharmacy    -Covid: obtain booster at outside pharmacy when available  -Colon Cancer Screen: n/a, age  -Breast Cancer Screen: n/a  -Cervical Cancer Screen: n/a  -Bone Density Screen: Dexa 2022, osteopenia  -HCV lifetime screen: n/a  -HIV lifetime screen: n/a      RTC 4 months     Sergey Ackerman MD  [employer] IM PGY III

## 2023-10-11 NOTE — PROGRESS NOTES
Date of Service: 1/19/23  Attending Attestation: Patient discussed with resident. The chart was reviewed thoroughly including pertinent vitals, labs, imaging, medications, prior notes, and consultant/specialist recommendations.  I participated in the management of the patient, examined the patient, reviewed the summary of the plan, and was immediately available at all times throughout the encounter. Services were furnished in a geriatric clinic located in the outpatient department of a teaching hospital. I agree with the resident's findings and plan as documented in the resident's note.    Jana Cruz MD  Attending - Family Medicine / Geriatric Medicine  [facility], [facility]

## 2024-02-19 ENCOUNTER — OFFICE VISIT (OUTPATIENT)
Dept: FAMILY MEDICINE | Facility: CLINIC | Age: 89
End: 2024-02-19
Payer: MEDICARE

## 2024-02-19 VITALS
RESPIRATION RATE: 20 BRPM | TEMPERATURE: 98 F | HEIGHT: 59 IN | HEART RATE: 85 BPM | WEIGHT: 143 LBS | SYSTOLIC BLOOD PRESSURE: 143 MMHG | BODY MASS INDEX: 28.83 KG/M2 | DIASTOLIC BLOOD PRESSURE: 83 MMHG | OXYGEN SATURATION: 97 %

## 2024-02-19 DIAGNOSIS — Z79.899 ENCOUNTER FOR MEDICATION REVIEW: ICD-10-CM

## 2024-02-19 DIAGNOSIS — E78.5 HYPERLIPIDEMIA, UNSPECIFIED HYPERLIPIDEMIA TYPE: ICD-10-CM

## 2024-02-19 DIAGNOSIS — Z23 NEED FOR VACCINATION: ICD-10-CM

## 2024-02-19 DIAGNOSIS — I10 PRIMARY HYPERTENSION: Primary | ICD-10-CM

## 2024-02-19 DIAGNOSIS — Z01.89 ENCOUNTER FOR GERIATRIC ASSESSMENT: ICD-10-CM

## 2024-02-19 DIAGNOSIS — Z76.0 MEDICATION REFILL: ICD-10-CM

## 2024-02-19 DIAGNOSIS — M85.80 OSTEOPENIA, UNSPECIFIED LOCATION: ICD-10-CM

## 2024-02-19 PROCEDURE — 90694 VACC AIIV4 NO PRSRV 0.5ML IM: CPT | Mod: PBBFAC

## 2024-02-19 PROCEDURE — 99214 OFFICE O/P EST MOD 30 MIN: CPT | Mod: PBBFAC | Performed by: FAMILY MEDICINE

## 2024-02-19 PROCEDURE — G0008 ADMIN INFLUENZA VIRUS VAC: HCPCS | Mod: PBBFAC

## 2024-02-19 RX ADMIN — INFLUENZA A VIRUS A/VICTORIA/4897/2022 IVR-238 (H1N1) ANTIGEN (FORMALDEHYDE INACTIVATED), INFLUENZA A VIRUS A/DARWIN/6/2021 IVR-227 (H3N2) ANTIGEN (FORMALDEHYDE INACTIVATED), INFLUENZA B VIRUS B/AUSTRIA/1359417/2021 BVR-26 ANTIGEN (FORMALDEHYDE INACTIVATED), INFLUENZA B VIRUS B/PHUKET/3073/2013 BVR-1B ANTIGEN (FORMALDEHYDE INACTIVATED) 0.5 ML: 15; 15; 15; 15 INJECTION, SUSPENSION INTRAMUSCULAR at 10:02

## 2024-02-19 NOTE — PROGRESS NOTES
[facility] Note      ### Person that attends appointment with patient is her partner (Mandy) not her daughter ####    Subjective:       Patient ID: Maria E Hernandez is a 89 y.o. female.    Chief Complaint: No chief complaint on file.      HPI:  89 y.o. female PMH HTN, insomnia, HLD, osteopenia.     Here with her partner Mandy for f/u of chronic issues, no acute complaints/recent ER/urgent care visits. Last seen 9/2023.     HTN   BP at home on occasion 130s  No meds  Denies HA, dizziness, vision changes    HLD  Adh to simvastatin  Denies CP, SOB, palpitations, orthopnea, PND, edema, leg claudication     Osteopenia  No recent falls     Insomnia  Stable  No longer taking melatonin, not helpful    Jami assessment:  no recent falls  appetite is good  sleep stable, several hours and on occasion wakes up and can't go back to sleep   Bowel normal, occasional stress urinary incont with use of pad    ADLs/iADLS -   Independent feeding, dressing, bathing, transferring, cleaning/laundry/shopping, cooking, managing finances  Denies leaving faucet or stove on, doors open/unlocked   telephone calls indep  Medications managed by self   no longer driving  ambulates with walker to bet to BR   Nocturia x2  Assistive devices: shower seat   Vision with glasses for reading   hearing aids   No dentures  memory similar to previous ( partner present for entire exam)  no behavioral issues, aggression  No hallucinations  No elopement  lives with partner 42y; no HH/PCA/PT    Allergies: NKDA   Current Outpatient Medications   Medication Sig Dispense Refill    simvastatin (ZOCOR) 10 MG tablet Take 1 tablet (10 mg total) by mouth every evening. 90 tablet 3     No current facility-administered medications for this visit.     Denies abdominal pain, dark tarry stools, diarr/const, rash      Objective:      Vitals:    02/19/24 0926   BP: (!) 143/83   Pulse:    Resp:    Temp:        General: no acute distress,well groomed and conversant;  accompanied by partner   CVS: RRR no murmur. Radial pulses 2+ BL no edema   Resp: CTA  GI: BS WNL nonTTP  Neuro: awake and alert   Psych: coop and appr      Assessment/Plan:   Med review, refill  Geriatric assessment   Need for vaccination       HTN  BP above goal, asymptomatic  continue DASH diet, home BP 130s  previously on Benazepril 10mg, restart if BP persistently elevated    HLD  Lipid WNL 9/2023  continue Simvastatin 10mg    Insomnia  continue melatonin 10mg PRN qhs    Osteopenia  11/2022 DEXA, moderate fracture risk   Fall Precautions given    CBC WNL 9/2023  CMP WNL 9/2023 other than Cr 1.07  Micro/Cr prot/Cr  Lipid WNL 9/2023 other than Tchol 202  Vit D WNL 9/2023  B12 with next labs  TSH WNL 9/2023    MMG w/sara no longer indicated  DEXA osteopenia 11/2022  CT H w/o 9/2022: chronic microvasc ischemia  Colonoscopy no longer indicated    Vaccinations:  COVID  x2 2/2021  Flu 10/2022, due today   PCV 13 9/2016  PCV 20 10/2022  Tdap 12/2018  Shingrix due     RTC in 6m     RUDDY Levy MD HO[facility]VIII  [employer] [facility] Geriatric Fellow

## 2024-10-21 ENCOUNTER — OFFICE VISIT (OUTPATIENT)
Dept: FAMILY MEDICINE | Facility: CLINIC | Age: 89
End: 2024-10-21
Payer: MEDICARE

## 2024-10-21 VITALS
OXYGEN SATURATION: 98 % | BODY MASS INDEX: 26.69 KG/M2 | TEMPERATURE: 98 F | HEIGHT: 59 IN | WEIGHT: 132.38 LBS | RESPIRATION RATE: 20 BRPM | DIASTOLIC BLOOD PRESSURE: 83 MMHG | SYSTOLIC BLOOD PRESSURE: 136 MMHG | HEART RATE: 60 BPM

## 2024-10-21 DIAGNOSIS — E78.5 HYPERLIPIDEMIA, UNSPECIFIED HYPERLIPIDEMIA TYPE: ICD-10-CM

## 2024-10-21 DIAGNOSIS — I10 PRIMARY HYPERTENSION: ICD-10-CM

## 2024-10-21 DIAGNOSIS — Z23 IMMUNIZATION DUE: Primary | ICD-10-CM

## 2024-10-21 DIAGNOSIS — M85.80 OSTEOPENIA, UNSPECIFIED LOCATION: ICD-10-CM

## 2024-10-21 PROCEDURE — 99213 OFFICE O/P EST LOW 20 MIN: CPT | Mod: PBBFAC | Performed by: FAMILY MEDICINE

## 2024-10-21 NOTE — PROGRESS NOTES
[facility] Note      ### Person that attends appointment with patient is her partner (Mandy) not her daughter ####    Subjective:       Patient ID: Maria E Hernandez is a 89 y.o. female.    Chief Complaint: Follow-up (F/u from ED d/c)      HPI:  89 y.o. female PMH HTN, insomnia, HLD, osteopenia.     Here with her partner Mandy for f/u of chronic issues.    Recent ED visit on 10/11/24 for abdominal pain and diarrhe. CT A/P findings suggestive of uncomplicated diverticulitis. Pt dc'd home on Cipro and Flagyl, course completed. Pt denies any further abdominal pain or diarrhea.     HTN  SBP at home 130s  No meds  Denies HA, dizziness, vision changes    HLD  Dc'd simvastatin  Denies CP, SOB, palpitations, LE edema    Osteopenia  No recent falls     Jami assessment:  no recent falls  appetite is good  sleep stable, occasionally awakes in the middle of the night but able to fall back asleep without issues  Bowel normal, occasional stress urinary incont, uses pad    ADLs/iADLS -   Independent feeding, dressing, bathing, transferring, cleaning/laundry/shopping, cooking, managing finances  Denies leaving faucet or stove on, doors open/unlocked   telephone calls indep  Medications managed by self and partner   no longer driving  ambulates with walker   Assistive devices: shower seat   Vision with glasses for reading   hearing aids   No dentures  memory similar to previous  lives with partner 42y; no HH/PCA/PT    Allergies: NKDA   No current outpatient medications on file.     No current facility-administered medications for this visit.     ROS: Denies fever, abdominal pain, dark tarry stools, diarrhea, constipation, CP, SOB      Objective:      Vitals:    10/21/24 1132   BP: 136/83   Pulse: 60   Resp: 20   Temp: 97.6 °F (36.4 °C)       General: no acute distress, well-appearing, well-groomed; accompanied by partner   CVS: RRR no murmur.   Resp: CTAB  GI: Abdomen soft, non-distended, non-tender   Neuro: awake and  alert   Psych: cooperative, thought content normal       Assessment/Plan:     HTN  BP at goal without medications   Continue DASH diet    HLD  Lipid WNL 9/2023  Simvastatin discontinued by provider     Osteopenia   11/2022 DEXA, moderate fracture risk   Fall Precautions given    -Labs last completed 09/2023 and wnl. Pt not taking medications. After shared decision making, will obtain labs every other year.   -After shared decision making, mammogram and colon cancer screening deferred.       RTC in 6m